# Patient Record
Sex: MALE | URBAN - METROPOLITAN AREA
[De-identification: names, ages, dates, MRNs, and addresses within clinical notes are randomized per-mention and may not be internally consistent; named-entity substitution may affect disease eponyms.]

---

## 2023-11-14 ENCOUNTER — PROCEDURE VISIT (OUTPATIENT)
Dept: AUDIOLOGY | Age: 68
End: 2023-11-14
Payer: MEDICARE

## 2023-11-14 ENCOUNTER — OFFICE VISIT (OUTPATIENT)
Dept: ENT CLINIC | Age: 68
End: 2023-11-14
Payer: MEDICARE

## 2023-11-14 VITALS
HEART RATE: 64 BPM | WEIGHT: 167 LBS | TEMPERATURE: 98.1 F | BODY MASS INDEX: 24.73 KG/M2 | SYSTOLIC BLOOD PRESSURE: 129 MMHG | DIASTOLIC BLOOD PRESSURE: 78 MMHG | HEIGHT: 69 IN | RESPIRATION RATE: 12 BRPM

## 2023-11-14 DIAGNOSIS — H69.91 ETD (EUSTACHIAN TUBE DYSFUNCTION), RIGHT: ICD-10-CM

## 2023-11-14 DIAGNOSIS — H90.3 SENSORINEURAL HEARING LOSS, BILATERAL: Primary | ICD-10-CM

## 2023-11-14 DIAGNOSIS — H90.3 SENSORINEURAL HEARING LOSS (SNHL) OF BOTH EARS: Primary | ICD-10-CM

## 2023-11-14 PROCEDURE — 3017F COLORECTAL CA SCREEN DOC REV: CPT

## 2023-11-14 PROCEDURE — 92567 TYMPANOMETRY: CPT | Performed by: AUDIOLOGIST

## 2023-11-14 PROCEDURE — G8420 CALC BMI NORM PARAMETERS: HCPCS

## 2023-11-14 PROCEDURE — 92557 COMPREHENSIVE HEARING TEST: CPT | Performed by: AUDIOLOGIST

## 2023-11-14 PROCEDURE — G8484 FLU IMMUNIZE NO ADMIN: HCPCS

## 2023-11-14 PROCEDURE — 99204 OFFICE O/P NEW MOD 45 MIN: CPT

## 2023-11-14 PROCEDURE — 1123F ACP DISCUSS/DSCN MKR DOCD: CPT

## 2023-11-14 PROCEDURE — 1036F TOBACCO NON-USER: CPT

## 2023-11-14 PROCEDURE — G8427 DOCREV CUR MEDS BY ELIG CLIN: HCPCS

## 2023-11-14 RX ORDER — FLUTICASONE PROPIONATE 50 MCG
2 SPRAY, SUSPENSION (ML) NASAL DAILY
Qty: 16 G | Refills: 1 | Status: SHIPPED | OUTPATIENT
Start: 2023-11-14

## 2023-11-14 RX ORDER — VALSARTAN 80 MG/1
TABLET ORAL
COMMUNITY
Start: 2023-09-05

## 2023-11-14 RX ORDER — ASPIRIN 81 MG/1
81 TABLET, CHEWABLE ORAL DAILY
COMMUNITY

## 2023-11-14 RX ORDER — ATORVASTATIN CALCIUM 40 MG/1
TABLET, FILM COATED ORAL
COMMUNITY
Start: 2023-09-05

## 2023-11-14 ASSESSMENT — ENCOUNTER SYMPTOMS
BACK PAIN: 0
EYE DISCHARGE: 0
DIARRHEA: 0
RHINORRHEA: 0
SHORTNESS OF BREATH: 0
SORE THROAT: 0
VOMITING: 0
ALLERGIC/IMMUNOLOGIC NEGATIVE: 1
SINUS PRESSURE: 0
COUGH: 0
EYE PAIN: 0

## 2023-11-14 NOTE — PROGRESS NOTES
This patient was referred for audiometric and tympanometric testing by SATNAM Wagner due to otitis media, and muffled hearing. Audiometry using pure tone air and bone conduction testing revealed hearing sensitivity within normal limits at 250 Hz sloping to a severe sensorineural hearing loss, in the left ear, and a mild sloping to severe mixed hearing loss, in the right ear. Reliability was good. Speech reception thresholds were in good agreement with the pure tone averages using Hoover's Average (two-frequency pure tone average), bilaterally. Speech discrimination scores were excellent, in the right ear, and good, in the left ear. Asymmetrical results obtained for speech discrimination: Left ear worse. Tympanometry revealed negative middle ear pressure (-159 daPa), in the right ear, and revealed normal middle ear peak pressure and compliance, in the left ear. The results were reviewed with the patient. Recommendations for follow up will be made pending physician consult.     Maximilian Munoz., Third Year Audiology Student  Electronically signed by Alexis Meyers on 11/14/2023 at 8:51 AM

## 2024-01-03 ENCOUNTER — PROCEDURE VISIT (OUTPATIENT)
Dept: AUDIOLOGY | Age: 69
End: 2024-01-03
Payer: MEDICARE

## 2024-01-03 ENCOUNTER — OFFICE VISIT (OUTPATIENT)
Dept: ENT CLINIC | Age: 69
End: 2024-01-03
Payer: MEDICARE

## 2024-01-03 VITALS
WEIGHT: 170 LBS | TEMPERATURE: 98.4 F | HEIGHT: 69 IN | BODY MASS INDEX: 25.18 KG/M2 | DIASTOLIC BLOOD PRESSURE: 83 MMHG | HEART RATE: 64 BPM | SYSTOLIC BLOOD PRESSURE: 136 MMHG | OXYGEN SATURATION: 99 % | RESPIRATION RATE: 12 BRPM

## 2024-01-03 DIAGNOSIS — H69.91 ETD (EUSTACHIAN TUBE DYSFUNCTION), RIGHT: Primary | ICD-10-CM

## 2024-01-03 PROCEDURE — 99213 OFFICE O/P EST LOW 20 MIN: CPT

## 2024-01-03 PROCEDURE — G8419 CALC BMI OUT NRM PARAM NOF/U: HCPCS

## 2024-01-03 PROCEDURE — 1123F ACP DISCUSS/DSCN MKR DOCD: CPT

## 2024-01-03 PROCEDURE — G8484 FLU IMMUNIZE NO ADMIN: HCPCS

## 2024-01-03 PROCEDURE — G8427 DOCREV CUR MEDS BY ELIG CLIN: HCPCS

## 2024-01-03 PROCEDURE — 1036F TOBACCO NON-USER: CPT

## 2024-01-03 PROCEDURE — 3017F COLORECTAL CA SCREEN DOC REV: CPT

## 2024-01-03 PROCEDURE — 92567 TYMPANOMETRY: CPT | Performed by: AUDIOLOGIST

## 2024-01-03 ASSESSMENT — ENCOUNTER SYMPTOMS
DIARRHEA: 0
VOMITING: 0
BACK PAIN: 0
ALLERGIC/IMMUNOLOGIC NEGATIVE: 1
SHORTNESS OF BREATH: 0
EYE DISCHARGE: 0
RHINORRHEA: 0
SORE THROAT: 0
SINUS PRESSURE: 0
EYE PAIN: 0
COUGH: 0

## 2024-01-03 NOTE — PROGRESS NOTES
Subjective:      Patient ID:  Valerio Vasquez is a 68 y.o. male.    HPI:    Patient presents today for recheck of the right ear.ETD  Patient is  doing better.  Pain: no  Drainage: no   Patient was last seen six weeks ago for hearing loss and tinnitus.   Was noted to have BL SNHL and was cleared for HA.   Has not pursued hearing aids at this time but is interested.   States he still feel right ear pressure.   Has been using flonase    History reviewed. No pertinent past medical history.  Past Surgical History:   Procedure Laterality Date    TONSILLECTOMY       History reviewed. No pertinent family history.  Social History     Socioeconomic History    Marital status: Unknown     Spouse name: None    Number of children: None    Years of education: None    Highest education level: None   Tobacco Use    Smoking status: Never     Passive exposure: Never    Smokeless tobacco: Never   Substance and Sexual Activity    Alcohol use: Yes    Drug use: Yes     Types: Marijuana (Weed)     Comment: occasionally     No Known Allergies      Review of Systems   Constitutional:  Negative for chills and fever.   HENT:  Positive for ear pain (Feels like someone shoved cotton in ears.), hearing loss and tinnitus. Negative for congestion, ear discharge, postnasal drip, rhinorrhea, sinus pressure, sneezing and sore throat.    Eyes:  Negative for pain and discharge.   Respiratory:  Negative for cough and shortness of breath.    Cardiovascular:  Negative for chest pain.   Gastrointestinal:  Negative for diarrhea and vomiting.   Genitourinary:  Negative for flank pain.   Musculoskeletal:  Negative for back pain and neck pain.   Skin:  Negative for rash.   Allergic/Immunologic: Negative.    Neurological:  Negative for syncope and headaches.   All other systems reviewed and are negative.      Objective:     Vitals:    01/03/24 0958   BP: 136/83   Pulse: 64   Resp: 12   Temp: 98.4 °F (36.9 °C)   SpO2: 99%     Physical Exam  Vitals reviewed.

## 2024-01-04 NOTE — PROGRESS NOTES
This patient was referred for audiometric/tympanometric testing by SATNAM Villalta due to eustachian tube dysfunction  .      Tympanometry revealed shallow tympanograms(0.20 ml), in the right ear and peak pressure and compliance within normal limits in the left ear.         The results were reviewed with the patient and CNP.     Recommendations for follow up will be made pending physician consult.    Alexis Caruso/CCC-A  [place] Lic # [id number]

## 2024-11-21 ENCOUNTER — TELEPHONE (OUTPATIENT)
Dept: ENT CLINIC | Age: 69
End: 2024-11-21

## 2024-11-21 NOTE — TELEPHONE ENCOUNTER
First attempt to r/s missed appt. Pt does not wish to r/s with us at this time.     Electronically signed by Riley Escobedo on 11/21/2024 at 10:58 AM